# Patient Record
Sex: MALE | Race: WHITE
[De-identification: names, ages, dates, MRNs, and addresses within clinical notes are randomized per-mention and may not be internally consistent; named-entity substitution may affect disease eponyms.]

---

## 2020-07-27 ENCOUNTER — HOSPITAL ENCOUNTER (OUTPATIENT)
Dept: HOSPITAL 83 - CARD | Age: 30
Discharge: HOME | End: 2020-07-27
Attending: FAMILY MEDICINE
Payer: COMMERCIAL

## 2020-07-27 DIAGNOSIS — Q21.3: ICD-10-CM

## 2020-07-27 DIAGNOSIS — I37.1: Primary | ICD-10-CM

## 2020-09-08 ENCOUNTER — TELEPHONE (OUTPATIENT)
Dept: CARDIOLOGY CLINIC | Age: 30
End: 2020-09-08

## 2020-09-08 NOTE — TELEPHONE ENCOUNTER
Patient Appointment Form:      PCP:Mendel  Referring: [place]     Has the Patient:    Seen a Cardiologist? yes    date:over 15yrs ago  Physician:Dr. Tri Potts  location:[place]    Had a heart catheterization? no    Had heart surgery?  yes   date:  he was 1month old   surgeon: ?   hospital name:  ?    Had a stress test or nuclear stress test? no    Had an echocardiogram? Yes 08/2020 in [facility]     Had a vascular ultrasound? no    Had a 24/48 heart monitor or extended cardiac event monitor? no    Had recent blood work in the last 6 months? no    Had a pacemaker/ICD/ILR implant? no    Seen an Electrophysiologist? no        Will send records via: fax      Date & time of appointment: 09/15 @ 8:30 RH

## 2022-06-05 ENCOUNTER — HOSPITAL ENCOUNTER (EMERGENCY)
Dept: HOSPITAL 83 - ED | Age: 32
Discharge: HOME | End: 2022-06-05
Payer: COMMERCIAL

## 2022-06-05 VITALS — HEIGHT: 60 IN | WEIGHT: 185 LBS

## 2022-06-05 DIAGNOSIS — K04.7: ICD-10-CM

## 2022-06-05 DIAGNOSIS — S02.5XXB: Primary | ICD-10-CM
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
